# Patient Record
Sex: FEMALE | Race: WHITE
[De-identification: names, ages, dates, MRNs, and addresses within clinical notes are randomized per-mention and may not be internally consistent; named-entity substitution may affect disease eponyms.]

---

## 2018-11-04 ENCOUNTER — HOSPITAL ENCOUNTER (EMERGENCY)
Dept: HOSPITAL 91 - FTE | Age: 23
Discharge: HOME | End: 2018-11-04
Payer: COMMERCIAL

## 2018-11-04 ENCOUNTER — HOSPITAL ENCOUNTER (EMERGENCY)
Age: 23
Discharge: HOME | End: 2018-11-04

## 2018-11-04 DIAGNOSIS — Z3A.01: ICD-10-CM

## 2018-11-04 DIAGNOSIS — O23.41: ICD-10-CM

## 2018-11-04 DIAGNOSIS — O20.9: Primary | ICD-10-CM

## 2018-11-04 LAB
ADD MAN DIFF?: NO
ADD UMIC: YES
BASOPHIL #: 0 10^3/UL (ref 0–0.1)
BASOPHILS %: 0.3 % (ref 0–2)
EOSINOPHILS #: 0 10^3/UL (ref 0–0.5)
EOSINOPHILS %: 0.6 % (ref 0–7)
HEMATOCRIT: 43.3 % (ref 37–47)
HEMOGLOBIN: 14.1 G/DL (ref 12–16)
IMMATURE GRANS #M: 0.01 10^3/UL (ref 0–0.03)
IMMATURE GRANS % (M): 0.2 % (ref 0–0.43)
LYMPHOCYTES #: 1.6 10^3/UL (ref 0.8–2.9)
LYMPHOCYTES %: 23.6 % (ref 15–51)
MEAN CORPUSCULAR HEMOGLOBIN: 27.5 PG (ref 29–33)
MEAN CORPUSCULAR HGB CONC: 32.6 G/DL (ref 32–37)
MEAN CORPUSCULAR VOLUME: 84.6 FL (ref 82–101)
MEAN PLATELET VOLUME: 12.1 FL (ref 7.4–10.4)
MONOCYTE #: 0.3 10^3/UL (ref 0.3–0.9)
MONOCYTES %: 4.5 % (ref 0–11)
NEUTROPHIL #: 4.7 10^3/UL (ref 1.6–7.5)
NEUTROPHILS %: 70.8 % (ref 39–77)
NUCLEATED RED BLOOD CELLS #: 0 10^3/UL (ref 0–0)
NUCLEATED RED BLOOD CELLS%: 0 /100WBC (ref 0–0)
PLATELET COUNT: 241 10^3/UL (ref 140–415)
RED BLOOD COUNT: 5.12 10^6/UL (ref 4.2–5.4)
RED CELL DISTRIBUTION WIDTH: 12.7 % (ref 11.5–14.5)
UR AMORPHOUS CRYSTAL: (no result) /HPF
UR ASCORBIC ACID: NEGATIVE MG/DL
UR BILIRUBIN (DIP): NEGATIVE MG/DL
UR BLOOD (DIP): (no result) MG/DL
UR CLARITY: (no result)
UR COLOR: YELLOW
UR GLUCOSE (DIP): NEGATIVE MG/DL
UR KETONES (DIP): NEGATIVE MG/DL
UR LEUKOCYTE ESTERASE (DIP): NEGATIVE LEU/UL
UR NITRITE (DIP): NEGATIVE MG/DL
UR PH (DIP): 8 (ref 5–9)
UR RBC: 55 /HPF (ref 0–5)
UR SPECIFIC GRAVITY (DIP): 1.01 (ref 1–1.03)
UR TOTAL PROTEIN (DIP): NEGATIVE MG/DL
UR UROBILINOGEN (DIP): NEGATIVE MG/DL
UR WBC: 30 /HPF (ref 0–5)
WHITE BLOOD COUNT: 6.6 10^3/UL (ref 4.8–10.8)

## 2018-11-04 PROCEDURE — 86901 BLOOD TYPING SEROLOGIC RH(D): CPT

## 2018-11-04 PROCEDURE — 76801 OB US < 14 WKS SINGLE FETUS: CPT

## 2018-11-04 PROCEDURE — 86900 BLOOD TYPING SEROLOGIC ABO: CPT

## 2018-11-04 PROCEDURE — 81025 URINE PREGNANCY TEST: CPT

## 2018-11-04 PROCEDURE — 99284 EMERGENCY DEPT VISIT MOD MDM: CPT

## 2018-11-04 PROCEDURE — 76817 TRANSVAGINAL US OBSTETRIC: CPT

## 2018-11-04 PROCEDURE — 85025 COMPLETE CBC W/AUTO DIFF WBC: CPT

## 2018-11-04 PROCEDURE — 84702 CHORIONIC GONADOTROPIN TEST: CPT

## 2018-11-04 PROCEDURE — 81001 URINALYSIS AUTO W/SCOPE: CPT

## 2019-01-07 ENCOUNTER — HOSPITAL ENCOUNTER (EMERGENCY)
Dept: HOSPITAL 10 - FTE | Age: 24
Discharge: HOME | End: 2019-01-07
Payer: COMMERCIAL

## 2019-01-07 ENCOUNTER — HOSPITAL ENCOUNTER (EMERGENCY)
Dept: HOSPITAL 91 - FTE | Age: 24
Discharge: HOME | End: 2019-01-07
Payer: COMMERCIAL

## 2019-01-07 VITALS — WEIGHT: 177.03 LBS | BODY MASS INDEX: 40.97 KG/M2 | HEIGHT: 55 IN

## 2019-01-07 VITALS — SYSTOLIC BLOOD PRESSURE: 129 MMHG | RESPIRATION RATE: 16 BRPM | HEART RATE: 81 BPM | DIASTOLIC BLOOD PRESSURE: 71 MMHG

## 2019-01-07 DIAGNOSIS — Z32.00: Primary | ICD-10-CM

## 2019-01-07 LAB
URINE PH (DIP) POC: 6 (ref 5–8.5)
URINE PH (DIP) POC: 6 (ref 5–8.5)

## 2019-01-07 PROCEDURE — 99284 EMERGENCY DEPT VISIT MOD MDM: CPT

## 2019-01-07 PROCEDURE — 81025 URINE PREGNANCY TEST: CPT

## 2019-01-07 PROCEDURE — 76801 OB US < 14 WKS SINGLE FETUS: CPT

## 2019-01-07 PROCEDURE — 81003 URINALYSIS AUTO W/O SCOPE: CPT

## 2019-01-07 PROCEDURE — 84702 CHORIONIC GONADOTROPIN TEST: CPT

## 2019-01-07 NOTE — ERD
ER Documentation


Chief Complaint


Chief Complaint





SENT PER OB FOR NO FETAL HEART TONE, PT 14 WKS PG, NO VB, NO CRAMPING





HPI


Patient is a 23-year-old female, G1, P0, presents the ER for concerns of needing


a ultrasound as no fetal heart tones were detected in office.  Patient was seen 


today by her OB/GYN Dr. Kateryna Spears.  Patient states due to insurance changes 


this was the first appointment with this physician today.  Patient states she 


has been seeing another OB/GYN.  Prior to today fetal heart tones were noted and


patient was told that she was approximately 14 weeks pregnant.  Patient reports 


having ultrasound images at home. Patient does report back pain.  She denies any


falls or trauma.  Patient denies any pelvic pain, vaginal bleeding, fevers, 


chills, nausea, vomiting, dysuria.





ROS


All systems reviewed and are negative except as per history of present illness.





Medications


Home Meds


Active Scripts


Cephalexin* (Keflex*) 500 Mg Capsule, 500 MG PO TID for 7 Days, CAP


   Prov:JERE BALL PA-C         18





Allergies


Allergies:  


Coded Allergies:  


     No Known Allergy (Unverified , 19)





PMhx/Soc


History of Surgery:  No


Anesthesia Reaction:  No


Hx Neurological Disorder:  No


Hx Respiratory Disorders:  No


Hx Cardiac Disorders:  No


Hx Psychiatric Problems:  No


Hx Miscellaneous Medical Probl:  No


Hx Alcohol Use:  Yes (OCCASIONALLY- NONE WHILE PREGNANT)


Hx Substance Use:  No


Hx Tobacco Use:  No


Smoking Status:  Never smoker





FmHx


Family History:  No diabetes





Physical Exam


Vitals





Vital Signs


  Date      Temp  Pulse  Resp  B/P (MAP)   Pulse Ox  O2          O2 Flow    FiO2


Time                                                 Delivery    Rate


    19  99.4     81    16      129/71        99


     12:24                           (90)





Physical Exam


GENERAL: Well-developed, well-nourished male. Appears in no acute distress. 


HEAD: Normocephalic, atraumatic. 


EYES: Pupils are equally reactive bilaterally. EOMs grossly intact. No 


conjunctival erythema. 


ENT: Moist mucous membranes. No uvula deviation. No kissing tonsils. 


NECK: Supple. No meningismus. Normal range of motion of the neck.


LUNG: Clear to auscultation bilaterally. No rhonchi, wheezing, rales or coarse 


breath sounds. 


HEART: Regular rate and rhythm. No murmurs, rubs or gallops.


ABDOMEN:  Soft, nontender, and nondistended. Positive bowel sounds in all four 


quadrants. No rebound tenderness, no guarding. (-) McBurney's point tenderness. 


No CVA tenderness.


EXTREMITIES: Equal pulses bilaterally. No peripheral clubbing, cyanosis or 


edema. No unilateral leg swelling.


NEUROLOGIC: Alert and oriented. Moving all four extremities without any 


difficulty. Normal speech. Steady gait. 


SKIN: Normal color. Warm and dry. No rashes or lesions.


Results 24 hrs





Laboratory Tests


Test
                   19
15:21  19
16:16   19
16:35   19
16:38


Bedside Urine pH                6.0                          6.0


(LAB)


Bedside Urine Protein  Negative                     Negative


(LAB)


Bedside Urine Glucose  Negative                     Negative


(UA)


Bedside Urine Ketones  Negative                     Negative


(LAB)


Bedside Urine Blood    Trace-intact                 Trace-intact


Bedside Urine Nitrite  Negative                     Negative


(LAB)


Bedside Urine          Negative 
     
             Negative 
     



Leukocyte
Esterase (L


POC Beta HCG,                         NEGATIVE


Qualitative


Beta HCG,                                                          < 2.4 mIU/ml


Quantitative








Procedures/MDM


ED COURSE:


The patient was stable throughout ED course. I kept the patient and/or family 


informed of laboratory and diagnostic imaging results throughout the ED course. 








 


DIAGNOSTIC IMAGING:


Read by radiologist.





DIAGNOSTIC IMAGING REPORT





Patient: SANDRINE BARRAZA   : 1995   Age: 23  Sex: F                    


   


MR #:    [id number]   Acct #:   [id number]    DOS: 19 1514


Ordering MD: JERE BALL PA-C   Location:  [facility]   Room/Bed:                   


                        





PROCEDURE:   US Pelvis. 


 


CLINICAL INDICATION:   vaginal bleeding 


 


TECHNIQUE:   Multiple sonographic images of the pelvis were obtained utilizing a


transabdominal technique.   The images were reviewed on a PACS workstation. 


 


COMPARISON:   None. 


 


FINDINGS:


The uterus is normal in size and demonstrates a normal appearance of the 


myometrium.  


The endometrial stripe is homogeneous in appearance and has the thickness of 9 


mm.


No intrauterine gestation is noted.


The ovaries are normal in size and echogenicity. Normal Doppler flow is 


identified in both ovaries.


The right ovary measures 3.1 x 2.4 x 2.9 cm.


The left ovary measures 3.8 x 2.1 x 2.5 cm.


No free fluid is present within the pelvis.. 


 


 


RPTAT: AA


 


 


IMPRESSION:


No intrauterine gestation visualized.


Differential diagnosis includes early pregnancy, missed  or ectopic 


pregnancy.  


Follow-up ultrasound and HCG levels is recommended.


_____________________________________________ 


.Mir Huang MD, MD           Date    Time 


Electronically viewed and signed by .Mir Huang MD, MD on 2019 


15:58 


 


D:  2019 15:58  T:  2019 15:58


.S/





CC: JERE BALL PA-C





[id number]











MEDICAL DECISION MAKING:


This is a 23-year-old female who presents to the ER needing pelvic ultrasound.  


Patient was seen today by her OB/GYN and fetal heart tones were not detected.  


She was referred to the ER for formal OB ultrasound.  Patient stated that she 


was 14 weeks pregnant.  Vital signs were reviewed.  Patient was afebrile.  


Patient was not hypoxic.





Pelvic ultrasound was obtained.  Pelvic ultrasound showed No intrauterine 


gestation visualized.


Differential diagnosis includes early pregnancy, missed  or ectopic 


pregnancy.  


Follow-up ultrasound and HCG levels is recommended.





Urine pregnancy test was negative.  Beta hCG quantitative was less than 2.4.  


Patient was advised of ultrasound and blood work as well as urine test results 


being negative for pregnancy.  Today's findings are consistent with previous 


history of being approximately 14 weeks pregnant.  Unclear if patient had a 


spontaneous  as patient stated that she did not have any episodes of 


vaginal bleeding since last 2018.  Patient was advised she should 


follow-up with her OB/GYN tomorrow morning and discuss all findings with her.   


A copy of all blood work and ultrasound imaging was given to patient.  Low 


suspicion for retained products, ectopic pregnancy, ruptured ectopic pregnancy, 


incomplete , septic .  Patient was nontoxic, non-opening prior 


to discharge.





DISCHARGE:


At this time, patient is stable for discharge and outpatient management. I have 


instructed the patient to promptly return to the ER at any time for any new or 


worsening symptoms including increased pain, nausea, vomiting, continued 


bleeding, weakness, syncope or fever. The patient and/or family expressed 


understanding of and agreement with this plan. All questions were answered. Home


care instructions were provided.





  Disclaimer: Inadvertent spelling and grammatical errors are likely due to 


EHR/dictation software use and do not reflect on the overall quality of patient 


care. Also, please note that the electronic time recorded on this note does not 


necessarily reflect the actual time of the patient encounter.





Departure


Diagnosis:  


   Primary Impression:  


   Encounter for laboratory test


Condition:  Stable





Additional Instructions:  


Follow up with your OB/GYN doctor.  Discussed today's beta-hCG and ultrasound 


findings.





Call your primary care doctor TOMORROW for an appointment during the next 1-2 


days.See the doctor sooner or return here if your condition worsens before your 


appointment time.











JERE BALL PA-C              2019 17:04